# Patient Record
Sex: MALE | Race: WHITE | Employment: OTHER | URBAN - NONMETROPOLITAN AREA
[De-identification: names, ages, dates, MRNs, and addresses within clinical notes are randomized per-mention and may not be internally consistent; named-entity substitution may affect disease eponyms.]

---

## 2017-06-06 ENCOUNTER — DOCTOR'S OFFICE (OUTPATIENT)
Dept: URBAN - NONMETROPOLITAN AREA CLINIC 1 | Facility: CLINIC | Age: 82
Setting detail: OPHTHALMOLOGY
End: 2017-06-06
Payer: COMMERCIAL

## 2017-06-06 DIAGNOSIS — Z96.1: ICD-10-CM

## 2017-06-06 DIAGNOSIS — H35.3131: ICD-10-CM

## 2017-06-06 DIAGNOSIS — H04.123: ICD-10-CM

## 2017-06-06 DIAGNOSIS — H26.491: ICD-10-CM

## 2017-06-06 DIAGNOSIS — H40.003: ICD-10-CM

## 2017-06-06 PROCEDURE — 76514 ECHO EXAM OF EYE THICKNESS: CPT | Performed by: OPHTHALMOLOGY

## 2017-06-06 PROCEDURE — 92083 EXTENDED VISUAL FIELD XM: CPT | Performed by: OPHTHALMOLOGY

## 2017-06-06 PROCEDURE — 92134 CPTRZ OPH DX IMG PST SGM RTA: CPT | Performed by: OPHTHALMOLOGY

## 2017-06-06 PROCEDURE — 92014 COMPRE OPH EXAM EST PT 1/>: CPT | Performed by: OPHTHALMOLOGY

## 2017-06-06 ASSESSMENT — VISUAL ACUITY
OD_BCVA: 20/25-
OS_BCVA: 20/30-2

## 2017-06-06 ASSESSMENT — REFRACTION_MANIFEST
OD_VA1: 20/
OS_VA2: 20/
OD_VA3: 20/
OD_VA3: 20/
OS_VA3: 20/
OS_VA3: 20/
OU_VA: 20/
OS_VA1: 20/
OD_VA1: 20/
OS_VA1: 20/
OD_VA1: 20/
OS_VA2: 20/
OD_VA2: 20/
OD_VA2: 20/
OU_VA: 20/
OD_VA3: 20/
OS_VA1: 20/
OU_VA: 20/
OS_VA2: 20/
OD_VA2: 20/
OS_VA3: 20/

## 2017-06-06 ASSESSMENT — PACHYMETRY
OD_CT_CORRECTION: 1
OS_CT_UM: 536
OD_CT_UM: 526
OS_CT_CORRECTION: 1

## 2017-06-06 ASSESSMENT — REFRACTION_CURRENTRX
OS_OVR_VA: 20/
OD_SPHERE: 0.00
OS_OVR_VA: 20/
OD_ADD: +3.00
OD_OVR_VA: 20/
OS_AXIS: 75
OS_OVR_VA: 20/
OD_CYLINDER: -0.50
OS_ADD: +3.00
OD_VPRISM_DIRECTION: BF
OD_AXIS: 92
OS_SPHERE: +0.25
OD_OVR_VA: 20/
OS_VPRISM_DIRECTION: BF
OD_OVR_VA: 20/
OS_CYLINDER: -0.50

## 2017-06-06 ASSESSMENT — TEAR BREAK UP TIME (TBUT)
OD_TBUT: T
OS_TBUT: T

## 2017-06-06 ASSESSMENT — SPHEQUIV_DERIVED
OS_SPHEQUIV: -0.375
OD_SPHEQUIV: -0.25

## 2017-06-06 ASSESSMENT — REFRACTION_AUTOREFRACTION
OS_CYLINDER: -1.75
OS_AXIS: 078
OD_CYLINDER: -1.00
OS_SPHERE: +0.50
OD_SPHERE: +0.25
OD_AXIS: 134

## 2017-06-06 ASSESSMENT — DRY EYES - PHYSICIAN NOTES
OS_GENERALCOMMENTS: KSICCA
OD_GENERALCOMMENTS: KSICCA

## 2017-06-06 ASSESSMENT — CONFRONTATIONAL VISUAL FIELD TEST (CVF)
OD_FINDINGS: FULL
OS_FINDINGS: FULL

## 2017-08-08 ENCOUNTER — ALLSCRIPTS OFFICE VISIT (OUTPATIENT)
Dept: OTHER | Facility: OTHER | Age: 82
End: 2017-08-08

## 2017-08-08 LAB
BILIRUB UR QL STRIP: NEGATIVE
CLARITY UR: NORMAL
COLOR UR: NORMAL
GLUCOSE (HISTORICAL): NEGATIVE
HGB UR QL STRIP.AUTO: NORMAL
KETONES UR STRIP-MCNC: NEGATIVE MG/DL
LEUKOCYTE ESTERASE UR QL STRIP: NORMAL
NITRITE UR QL STRIP: NORMAL
PH UR STRIP.AUTO: 5.5 [PH]
PROT UR STRIP-MCNC: 300 MG/DL
SP GR UR STRIP.AUTO: > 1.03
UROBILINOGEN UR QL STRIP.AUTO: 0.2

## 2017-10-24 ENCOUNTER — GENERIC CONVERSION - ENCOUNTER (OUTPATIENT)
Dept: OTHER | Facility: OTHER | Age: 82
End: 2017-10-24

## 2017-12-06 ENCOUNTER — DOCTOR'S OFFICE (OUTPATIENT)
Dept: URBAN - NONMETROPOLITAN AREA CLINIC 1 | Facility: CLINIC | Age: 82
Setting detail: OPHTHALMOLOGY
End: 2017-12-06
Payer: COMMERCIAL

## 2017-12-06 DIAGNOSIS — H04.123: ICD-10-CM

## 2017-12-06 DIAGNOSIS — Z96.1: ICD-10-CM

## 2017-12-06 DIAGNOSIS — H04.122: ICD-10-CM

## 2017-12-06 DIAGNOSIS — H35.3131: ICD-10-CM

## 2017-12-06 DIAGNOSIS — H04.121: ICD-10-CM

## 2017-12-06 DIAGNOSIS — H26.491: ICD-10-CM

## 2017-12-06 DIAGNOSIS — H40.003: ICD-10-CM

## 2017-12-06 PROCEDURE — 92133 CPTRZD OPH DX IMG PST SGM ON: CPT | Performed by: OPHTHALMOLOGY

## 2017-12-06 PROCEDURE — 83861 MICROFLUID ANALY TEARS: CPT | Performed by: OPHTHALMOLOGY

## 2017-12-06 PROCEDURE — 92014 COMPRE OPH EXAM EST PT 1/>: CPT | Performed by: OPHTHALMOLOGY

## 2017-12-06 ASSESSMENT — REFRACTION_MANIFEST
OD_VA1: 20/
OD_VA2: 20/
OS_VA2: 20/
OS_VA3: 20/
OD_VA3: 20/
OD_VA2: 20/
OU_VA: 20/
OU_VA: 20/
OS_VA2: 20/
OS_VA3: 20/
OD_VA2: 20/
OD_VA3: 20/
OS_VA1: 20/
OD_VA1: 20/
OS_VA1: 20/
OD_VA1: 20/
OS_VA1: 20/
OD_VA3: 20/
OS_VA3: 20/
OU_VA: 20/
OS_VA2: 20/

## 2017-12-06 ASSESSMENT — REFRACTION_AUTOREFRACTION
OD_CYLINDER: -1.00
OS_AXIS: 078
OS_SPHERE: +0.50
OD_SPHERE: +0.25
OD_AXIS: 134
OS_CYLINDER: -1.75

## 2017-12-06 ASSESSMENT — CONFRONTATIONAL VISUAL FIELD TEST (CVF)
OS_FINDINGS: FULL
OD_FINDINGS: FULL

## 2017-12-06 ASSESSMENT — REFRACTION_CURRENTRX
OS_ADD: +3.00
OD_OVR_VA: 20/
OD_OVR_VA: 20/
OS_SPHERE: +0.25
OD_CYLINDER: -0.50
OS_VPRISM_DIRECTION: BF
OD_OVR_VA: 20/
OS_OVR_VA: 20/
OD_SPHERE: PLANO
OS_OVR_VA: 20/
OS_CYLINDER: -0.50
OD_VPRISM_DIRECTION: BF
OD_ADD: +3.00
OS_AXIS: 75
OS_OVR_VA: 20/
OD_AXIS: 92

## 2017-12-06 ASSESSMENT — TEAR BREAK UP TIME (TBUT)
OD_TBUT: T
OS_TBUT: T

## 2017-12-06 ASSESSMENT — VISUAL ACUITY
OD_BCVA: 20/25-
OS_BCVA: 20/25-2

## 2017-12-06 ASSESSMENT — SPHEQUIV_DERIVED
OD_SPHEQUIV: -0.25
OS_SPHEQUIV: -0.375

## 2017-12-06 ASSESSMENT — DRY EYES - PHYSICIAN NOTES
OD_GENERALCOMMENTS: KSICCA
OS_GENERALCOMMENTS: KSICCA

## 2018-01-13 VITALS
WEIGHT: 158 LBS | DIASTOLIC BLOOD PRESSURE: 84 MMHG | SYSTOLIC BLOOD PRESSURE: 142 MMHG | BODY MASS INDEX: 20.28 KG/M2 | HEIGHT: 74 IN

## 2018-01-23 NOTE — MISCELLANEOUS
Message   Recorded as Task   Date: 01/04/2018 10:35 AM, Created By: Paula Tapia   Task Name: Care Coordination   Assigned To: Brandon HUITRON,TEAM   Regarding Patient: Marty Gong, Status: In Progress   Comment:    Dayana Hicks - 04 Jan 2018 10:35 AM     TASK CREATED  Patient called in asking to speak to the nurse can he be called back at  please and thank you  Art Cornell - 04 Jan 2018 10:43 AM     TASK IN PROGRESS   Temchris Cornell - 04 Jan 2018 10:44 AM     TASK EDITED  Pt wants to transfer records to [facility] pt a medical request form  Active Problems    1  Acute UTI (599 0) (N39 0)    Current Meds   1  AmLODIPine Besylate 10 MG Oral Tablet; Therapy: (Recorded:07Aug2017) to Recorded   2  Aspirin 81 MG TABS; Therapy: (Recorded:07Aug2017) to Recorded   3  Atorvastatin Calcium 10 MG Oral Tablet; Therapy: (Roro Saldana) to Recorded   4  Cephalexin 250 MG Oral Capsule; TAKE 1 CAPSULE 4 TIMES DAILY; Therapy: 08Aug2017 to (Evaluate:[id number])  Requested for: 08Aug2017; Last   Rx:08Aug2017; Status: ACTIVE - Transmit to Pharmacy - Awaiting Verification   Ordered   5  Fish Oil 1000 MG Oral Capsule; Therapy: (Roro Saldana) to Recorded   6  Nitroglycerin 0 4 MG Sublingual Tablet Sublingual;   Therapy: (Recorded:07Aug2017) to Recorded   7  Ocuvite PreserVision TABS; Therapy: (Roro Saldana) to Recorded   8  Pantoprazole Sodium 40 MG Oral Tablet Delayed Release; Therapy: (Recorded:07Aug2017) to Recorded   9  Toprol XL 50 MG Oral Tablet Extended Release 24 Hour (Metoprolol Succinate ER); Therapy: (Recorded:07Aug2017) to Recorded   10  Vitamin D3 400 UNIT Oral Capsule; Therapy: (Recorded:07Aug2017) to Recorded    Allergies    1  No Known Drug Allergies    2  No Known Environmental Allergies   3   No Known Food Allergies    Signatures   Electronically signed by : Gonzalo Lovell, ; Jan 4 2018 10:45AM EST (Author)

## 2018-06-26 ENCOUNTER — DOCTOR'S OFFICE (OUTPATIENT)
Dept: URBAN - NONMETROPOLITAN AREA CLINIC 1 | Facility: CLINIC | Age: 83
Setting detail: OPHTHALMOLOGY
End: 2018-06-26
Payer: COMMERCIAL

## 2018-06-26 DIAGNOSIS — H04.121: ICD-10-CM

## 2018-06-26 DIAGNOSIS — B02.29: ICD-10-CM

## 2018-06-26 DIAGNOSIS — H04.123: ICD-10-CM

## 2018-06-26 DIAGNOSIS — H04.222: ICD-10-CM

## 2018-06-26 DIAGNOSIS — H04.122: ICD-10-CM

## 2018-06-26 DIAGNOSIS — H40.013: ICD-10-CM

## 2018-06-26 DIAGNOSIS — H35.3131: ICD-10-CM

## 2018-06-26 DIAGNOSIS — Z96.1: ICD-10-CM

## 2018-06-26 DIAGNOSIS — H26.491: ICD-10-CM

## 2018-06-26 PROCEDURE — 92083 EXTENDED VISUAL FIELD XM: CPT | Performed by: OPHTHALMOLOGY

## 2018-06-26 PROCEDURE — 83861 MICROFLUID ANALY TEARS: CPT | Performed by: OPHTHALMOLOGY

## 2018-06-26 PROCEDURE — 92134 CPTRZ OPH DX IMG PST SGM RTA: CPT | Performed by: OPHTHALMOLOGY

## 2018-06-26 PROCEDURE — 92014 COMPRE OPH EXAM EST PT 1/>: CPT | Performed by: OPHTHALMOLOGY

## 2018-06-26 ASSESSMENT — REFRACTION_CURRENTRX
OD_VPRISM_DIRECTION: BF
OS_OVR_VA: 20/
OD_OVR_VA: 20/
OS_ADD: +3.00
OS_CYLINDER: -0.50
OS_SPHERE: +0.25
OD_SPHERE: PLANO
OD_OVR_VA: 20/
OS_OVR_VA: 20/
OS_AXIS: 75
OD_AXIS: 92
OD_CYLINDER: -0.50
OS_OVR_VA: 20/
OD_ADD: +3.00
OS_VPRISM_DIRECTION: BF
OD_OVR_VA: 20/

## 2018-06-26 ASSESSMENT — REFRACTION_MANIFEST
OS_VA2: 20/
OD_VA2: 20/
OS_VA2: 20/
OD_VA1: 20/
OD_VA3: 20/
OD_VA3: 20/
OS_VA2: 20/
OU_VA: 20/
OU_VA: 20/
OS_VA1: 20/
OD_VA2: 20/
OD_VA1: 20/
OS_VA1: 20/
OU_VA: 20/
OD_VA2: 20/
OS_VA1: 20/
OD_VA1: 20/
OD_VA3: 20/
OS_VA3: 20/

## 2018-06-26 ASSESSMENT — SPHEQUIV_DERIVED
OD_SPHEQUIV: -0.25
OS_SPHEQUIV: -0.375

## 2018-06-26 ASSESSMENT — VISUAL ACUITY
OS_BCVA: 20/25
OD_BCVA: 20/25

## 2018-06-26 ASSESSMENT — REFRACTION_AUTOREFRACTION
OS_AXIS: 078
OS_CYLINDER: -1.75
OD_CYLINDER: -1.00
OS_SPHERE: +0.50
OD_SPHERE: +0.25
OD_AXIS: 134

## 2018-06-26 ASSESSMENT — DRY EYES - PHYSICIAN NOTES
OS_GENERALCOMMENTS: KSICCA
OD_GENERALCOMMENTS: KSICCA

## 2018-06-26 ASSESSMENT — TEAR BREAK UP TIME (TBUT)
OS_TBUT: T
OD_TBUT: T

## 2018-06-26 ASSESSMENT — CONFRONTATIONAL VISUAL FIELD TEST (CVF)
OS_FINDINGS: FULL
OD_FINDINGS: FULL

## 2018-07-27 ENCOUNTER — DOCTOR'S OFFICE (OUTPATIENT)
Dept: URBAN - NONMETROPOLITAN AREA CLINIC 1 | Facility: CLINIC | Age: 83
Setting detail: OPHTHALMOLOGY
End: 2018-07-27
Payer: COMMERCIAL

## 2018-07-27 DIAGNOSIS — H02.105: ICD-10-CM

## 2018-07-27 DIAGNOSIS — H26.491: ICD-10-CM

## 2018-07-27 DIAGNOSIS — Z96.1: ICD-10-CM

## 2018-07-27 DIAGNOSIS — H04.121: ICD-10-CM

## 2018-07-27 DIAGNOSIS — H35.3131: ICD-10-CM

## 2018-07-27 DIAGNOSIS — H04.222: ICD-10-CM

## 2018-07-27 DIAGNOSIS — H40.003: ICD-10-CM

## 2018-07-27 DIAGNOSIS — H04.122: ICD-10-CM

## 2018-07-27 DIAGNOSIS — B02.29: ICD-10-CM

## 2018-07-27 PROCEDURE — 92012 INTRM OPH EXAM EST PATIENT: CPT | Performed by: OPHTHALMOLOGY

## 2018-07-27 ASSESSMENT — REFRACTION_MANIFEST
OS_VA2: 20/
OS_VA1: 20/
OD_VA3: 20/
OD_VA1: 20/
OS_VA1: 20/
OS_VA3: 20/
OD_VA2: 20/
OD_VA3: 20/
OD_VA2: 20/
OS_VA3: 20/
OS_VA2: 20/
OS_VA2: 20/
OS_VA3: 20/
OD_VA1: 20/
OD_VA3: 20/
OU_VA: 20/
OU_VA: 20/
OD_VA2: 20/
OU_VA: 20/
OS_VA1: 20/
OD_VA1: 20/

## 2018-07-27 ASSESSMENT — LID POSITION - COMMENTS: OS_COMMENTS: PUNCTAL ECTROPION LLL

## 2018-07-27 ASSESSMENT — REFRACTION_CURRENTRX
OD_AXIS: 92
OS_SPHERE: +0.25
OS_CYLINDER: -0.50
OD_OVR_VA: 20/
OS_OVR_VA: 20/
OD_ADD: +3.00
OS_ADD: +3.00
OD_SPHERE: PLANO
OD_OVR_VA: 20/
OD_CYLINDER: -0.50
OS_OVR_VA: 20/
OS_OVR_VA: 20/
OS_VPRISM_DIRECTION: BF
OD_OVR_VA: 20/
OS_AXIS: 75
OD_VPRISM_DIRECTION: BF

## 2018-07-27 ASSESSMENT — SPHEQUIV_DERIVED
OD_SPHEQUIV: -0.25
OS_SPHEQUIV: -0.375

## 2018-07-27 ASSESSMENT — REFRACTION_AUTOREFRACTION
OS_CYLINDER: -1.75
OD_SPHERE: +0.25
OD_AXIS: 134
OS_AXIS: 078
OD_CYLINDER: -1.00
OS_SPHERE: +0.50

## 2018-07-27 ASSESSMENT — VISUAL ACUITY
OD_BCVA: 20/40+2
OS_BCVA: 20/25-1

## 2018-07-27 ASSESSMENT — TEAR BREAK UP TIME (TBUT)
OS_TBUT: T
OD_TBUT: T

## 2018-07-27 ASSESSMENT — DRY EYES - PHYSICIAN NOTES
OD_GENERALCOMMENTS: KSICCA
OS_GENERALCOMMENTS: KSICCA

## 2018-07-27 ASSESSMENT — LID POSITION - ECTROPION: OS_ECTROPION: LLL 1+

## 2018-08-10 ENCOUNTER — OFFICE VISIT (OUTPATIENT)
Dept: UROLOGY | Facility: MEDICAL CENTER | Age: 83
End: 2018-08-10
Payer: MEDICARE

## 2018-08-10 VITALS
DIASTOLIC BLOOD PRESSURE: 76 MMHG | WEIGHT: 152 LBS | SYSTOLIC BLOOD PRESSURE: 122 MMHG | BODY MASS INDEX: 19.51 KG/M2 | HEIGHT: 74 IN

## 2018-08-10 DIAGNOSIS — N40.0 BPH WITHOUT OBSTRUCTION/LOWER URINARY TRACT SYMPTOMS: Primary | ICD-10-CM

## 2018-08-10 LAB
SL AMB  POCT GLUCOSE, UA: NEGATIVE
SL AMB LEUKOCYTE ESTERASE,UA: ABNORMAL
SL AMB POCT BILIRUBIN,UA: NEGATIVE
SL AMB POCT BLOOD,UA: ABNORMAL
SL AMB POCT CLARITY,UA: CLEAR
SL AMB POCT COLOR,UA: YELLOW
SL AMB POCT KETONES,UA: NEGATIVE
SL AMB POCT NITRITE,UA: NEGATIVE
SL AMB POCT PH,UA: 6
SL AMB POCT SPECIFIC GRAVITY,UA: 1.02
SL AMB POCT URINE PROTEIN: ABNORMAL
SL AMB POCT UROBILINOGEN: 0.2

## 2018-08-10 PROCEDURE — 99214 OFFICE O/P EST MOD 30 MIN: CPT | Performed by: UROLOGY

## 2018-08-10 PROCEDURE — 81003 URINALYSIS AUTO W/O SCOPE: CPT | Performed by: UROLOGY

## 2018-08-10 RX ORDER — ACETAMINOPHEN 500 MG
500 TABLET ORAL AS NEEDED
COMMUNITY
Start: 2016-12-23

## 2018-08-10 RX ORDER — BIOTIN 1 MG
TABLET ORAL DAILY
COMMUNITY

## 2018-08-10 RX ORDER — PANTOPRAZOLE SODIUM 40 MG/1
TABLET, DELAYED RELEASE ORAL DAILY
COMMUNITY

## 2018-08-10 RX ORDER — METOPROLOL SUCCINATE 50 MG/1
1.5 TABLET, EXTENDED RELEASE ORAL DAILY
COMMUNITY

## 2018-08-10 RX ORDER — CHLORAL HYDRATE 500 MG
CAPSULE ORAL DAILY
COMMUNITY

## 2018-08-10 RX ORDER — NITROGLYCERIN 0.4 MG/1
TABLET SUBLINGUAL AS NEEDED
COMMUNITY

## 2018-08-10 RX ORDER — ATORVASTATIN CALCIUM 10 MG/1
TABLET, FILM COATED ORAL DAILY
COMMUNITY

## 2018-08-10 RX ORDER — AMLODIPINE BESYLATE 10 MG/1
TABLET ORAL AS NEEDED
COMMUNITY

## 2018-08-10 NOTE — ASSESSMENT & PLAN NOTE
The patient is asymptomatic approximately 1 year following a GreenLight laser transurethral resection of his prostate  Digital rectal exam is mildly abnormal considering the patient's age and lack of symptoms, the firmness on the right side of his prostate does not require workup now

## 2018-08-10 NOTE — PATIENT INSTRUCTIONS
Benign Prostatic Hypertrophy   WHAT YOU NEED TO KNOW:   What is benign prostatic hypertrophy? Benign prostatic hypertrophy (BPH) is a condition that causes your prostate gland to grow larger than normal  The prostate gland is the male sex gland that produces a fluid that is part of semen  It is about the size of a walnut and it is located under the bladder  As the prostate grows, it can squeeze the urethra  This can block urine flow and cause urinary problems  What causes BPH? It is not known what causes BPH  It may be just part of getting older  BPH is very common in men over 39years of age, but rarely causes problems before age 61  Some healthcare providers believe it is caused by a change in hormone levels as men get older  What are the signs and symptoms of BPH? · Feeling like you have not emptied your bladder    · Feeling the need to urinate right away     · Urine does not flow right away when you start to urinate or stops and starts again    · Frequent urination, especially at night    · Weak urine stream    · Blood in your urine  How is BPH diagnosed? · Blood tests:  Blood tests such as prostate specific antigen (PSA) measurement may be done  The amount of PSA in your blood may go up if you have BPH  · Cystoscopy: A cystoscopy allows caregivers to look for problems inside your bladder  A cystoscope is put into your bladder through your urethra  The urethra is the tube that urine flows through when you urinate  The cystoscope is a long tube with a lens and a light on the end  The scope may be hooked to a camera or monitor, and pictures may be taken  A tissue sample may also be taken during your cystoscopy  During this test, small tumors may be removed or bleeding may be stopped       · Digital rectal examination:  Your healthcare provider will use his finger to feel if your prostate is larger than normal      · Prostate biopsy:  A small piece of the prostate is removed and sent to a lab for tests      · Transrectal ultrasound:  Sound waves are used to show pictures of your prostate on a monitor  · Urine tests:     ¨ Urine sample: For this test you need to urinate into a small container  You will be given instructions on how to clean your genital area before you urinate  Do not touch the inside of the cup  Follow instructions on where to place the cup of urine when you are done  ¨ Residual urine measurement:  Healthcare providers may use a catheter to measure how much urine is left in your bladder after you urinate  ¨ Flow rate recording: This is a test that measures the speed of your urine stream   How is BPH treated? · Medicines:      ¨ Alpha blockers: This medicine relaxes the muscles in your prostate and bladder  It may help you urinate more easily  ¨ 5 alpha reductase inhibitors: These medicines block the production of a hormone that causes the prostate to get larger  It may help to slow the growth of the prostate or shrink the prostate  · Stent:  A stent is a short, tiny mesh tube that is put into the urethra to hold it open  · Surgery: You may need surgery to remove your prostate  · Other procedures: The prostate may be made smaller by a procedure such as a transurethral needle ablation (TUNA) or microwave heat treatment  You may also have a laser procedure called interstitial laser coagulation (ILC) to remove the prostate  What are the risks of BPH?   · Surgery to remove your prostate may cause you to bleed more than expected or get an infection  You may also have trouble controlling your bladder  This may cause you to leak urine  You may also have sudden strong urges to urinate that make it hard for you to hold your urine  · Without treatment, you may have other health problems  If urine stays in your bladder too long, you may develop a urinary infection or bladder stones   Small blood vessels in the bladder and prostate may start to bleed because you strain more when you urinate  The urethra may suddenly become blocked off, making it even harder to urinate  Over time, your kidneys can be damaged as urine backs up from the bladder and into the kidneys  How can I manage BPH?   · Do not let your bladder get too full before you empty it  Urinate when you feel the urge  · Limit alcohol  Do not drink large amounts of any liquid at one time  · Decrease the amount of salt you eat  Examples of salty foods are chips, cured meats, and canned soups  Do not use table salt  · Healthcare providers may tell you not to eat spicy foods such as chilli peppers  This may help you find out if spicy food makes your BPH symptoms worse  · You may have sex if you feel well  When should I contact my healthcare provider? · There is a large amount of blood in your urine  · Your signs and symptoms get worse  · You have a fever  · You have questions or concerns about your condition or care  When should I seek immediate care? · You are unable to urinate  · Your bladder feels very full and painful  CARE AGREEMENT:   You have the right to help plan your care  Learn about your health condition and how it may be treated  Discuss treatment options with your caregivers to decide what care you want to receive  You always have the right to refuse treatment  The above information is an  only  It is not intended as medical advice for individual conditions or treatments  Talk to your doctor, nurse or pharmacist before following any medical regimen to see if it is safe and effective for you  © 2017 [employer] Information is for End User's use only and may not be sold, redistributed or otherwise used for commercial purposes  All illustrations and images included in CareNotes® are the copyrighted property of A [employer]  or Volodymyr Rodriguez

## 2018-08-10 NOTE — PROGRESS NOTES
IPSS Questionnaire (AUA-7): Over the past month    1)  How often have you had a sensation of not emptying your bladder completely after you finish urinating? 0 - Not at all   2)  How often have you had to urinate again less than two hours after you finished urinating? 1 - Less than 1 time in 5   3)  How often have you found you stopped and started again several times when you urinated? 0 - Not at all   4) How difficult have you found it to postpone urination? 1 - Less than 1 time in 5   5) How often have you had a weak urinary stream?  1 - Less than 1 time in 5   6) How often have you had to push or strain to begin urination? 0 - Not at all   7) How many times did you most typically get up to urinate from the time you went to bed until the time you got up in the morning?   1 - 1 time   Total Score:  4

## 2018-08-10 NOTE — PROGRESS NOTES
Assessment/Plan:    BPH without obstruction/lower urinary tract symptoms   The patient is asymptomatic approximately 1 year following a GreenLight laser transurethral resection of his prostate  Digital rectal exam is mildly abnormal considering the patient's age and lack of symptoms, the firmness on the right side of his prostate does not require workup now  Diagnoses and all orders for this visit:    BPH without obstruction/lower urinary tract symptoms  -     POCT urine dip auto non-scope    Other orders  -     acetaminophen (TYLENOL) 500 mg tablet; Take 500 mg by mouth as needed  -     amLODIPine (NORVASC) 10 mg tablet; Take by mouth as needed  -     aspirin 81 MG tablet; Take by mouth daily  -     atorvastatin (LIPITOR) 10 mg tablet; Take by mouth daily  -     Omega-3 Fatty Acids (FISH OIL) 1,000 mg; Take by mouth daily  -     nitroglycerin (NITROSTAT) 0 4 mg SL tablet; Place under the tongue as needed  -     Multiple Vitamins-Minerals (OCUVITE PRESERVISION PO); Take by mouth daily  -     pantoprazole (PROTONIX) 40 mg tablet; Take by mouth daily  -     Cholecalciferol (VITAMIN D3) 1000 units CAPS; Take by mouth daily  -     metoprolol succinate (TOPROL XL) 50 mg 24 hr tablet; Take 1 5 tablets by mouth daily          Subjective:      Patient ID: Samira Cannon is a 80 y o  male  HPI   BPH without obstruction:   The patient had a GreenLight laser TUR on June 1, 2017  He notes nocturia x 1  He denies other significant urinary symptoms  He denies gross hematuria, urinary tract infections or incontinence  He is taking neither medications nor supplements for his symptoms  The following portions of the patient's history were reviewed and updated as appropriate: allergies, current medications, past family history, past medical history, past social history, past surgical history and problem list     Review of Systems   Constitutional: Negative for activity change and fatigue     Respiratory: Negative for shortness of breath and wheezing  Cardiovascular: Negative for chest pain  HTN  Gastrointestinal: Negative for abdominal pain  Genitourinary: Negative for difficulty urinating, dysuria, frequency, hematuria and urgency  Musculoskeletal: Negative for back pain and gait problem  Skin: Negative  Allergic/Immunologic: Negative  Neurological: Negative  Psychiatric/Behavioral: Negative  Objective:      /76 (BP Location: Left arm, Patient Position: Sitting, Cuff Size: Standard)   Ht 6' 2" (1 88 m)   Wt 68 9 kg (152 lb)   BMI 19 52 kg/m²          Physical Exam   Constitutional: He is oriented to person, place, and time  He appears well-developed and well-nourished  HENT:   Head: Normocephalic and atraumatic  Eyes: EOM are normal    Neck: Normal range of motion  Neck supple  Pulmonary/Chest: Effort normal    Genitourinary: Rectum normal    Genitourinary Comments: The prostate is 40 grams, firm, smooth and non-tender  Right lobe larger and firmer than left  Musculoskeletal: Normal range of motion  Neurological: He is alert and oriented to person, place, and time  Skin: Skin is warm and dry  Psychiatric: He has a normal mood and affect   His behavior is normal  Judgment and thought content normal

## 2018-08-10 NOTE — LETTER
August 10, 2018     Jerrica Martínez MD  [address] [place] [id number]    Patient: Miroslava Garcia   YOB: 1930   Date of Visit: 8/10/2018       Dear Dr Janette Rico:    Thank you for referring Naya Vail to me for evaluation  Below are my notes for this consultation  If you have questions, please do not hesitate to call me  I look forward to following your patient along with you  Sincerely,        Alison Pennington MD        CC: No Recipients  Alison Pennington MD  8/10/2018 10:03 AM  Sign at close encounter  Assessment/Plan:    BPH without obstruction/lower urinary tract symptoms   The patient is asymptomatic approximately 1 year following a GreenLight laser transurethral resection of his prostate  Digital rectal exam is mildly abnormal considering the patient's age and lack of symptoms, the firmness on the right side of his prostate does not require workup now  Diagnoses and all orders for this visit:    BPH without obstruction/lower urinary tract symptoms  -     POCT urine dip auto non-scope    Other orders  -     acetaminophen (TYLENOL) 500 mg tablet; Take 500 mg by mouth as needed  -     amLODIPine (NORVASC) 10 mg tablet; Take by mouth as needed  -     aspirin 81 MG tablet; Take by mouth daily  -     atorvastatin (LIPITOR) 10 mg tablet; Take by mouth daily  -     Omega-3 Fatty Acids (FISH OIL) 1,000 mg; Take by mouth daily  -     nitroglycerin (NITROSTAT) 0 4 mg SL tablet; Place under the tongue as needed  -     Multiple Vitamins-Minerals (OCUVITE PRESERVISION PO); Take by mouth daily  -     pantoprazole (PROTONIX) 40 mg tablet; Take by mouth daily  -     Cholecalciferol (VITAMIN D3) 1000 units CAPS; Take by mouth daily  -     metoprolol succinate (TOPROL XL) 50 mg 24 hr tablet; Take 1 5 tablets by mouth daily          Subjective:      Patient ID: Miroslava Garcia is a 80 y o  male      HPI   BPH without obstruction:   The patient had a GreenLight laser TUR on June 1, 2017  He notes nocturia x 1  He denies other significant urinary symptoms  He denies gross hematuria, urinary tract infections or incontinence  He is taking neither medications nor supplements for his symptoms  The following portions of the patient's history were reviewed and updated as appropriate: allergies, current medications, past family history, past medical history, past social history, past surgical history and problem list     Review of Systems   Constitutional: Negative for activity change and fatigue  Respiratory: Negative for shortness of breath and wheezing  Cardiovascular: Negative for chest pain  HTN  Gastrointestinal: Negative for abdominal pain  Genitourinary: Negative for difficulty urinating, dysuria, frequency, hematuria and urgency  Musculoskeletal: Negative for back pain and gait problem  Skin: Negative  Allergic/Immunologic: Negative  Neurological: Negative  Psychiatric/Behavioral: Negative  Objective:      /76 (BP Location: Left arm, Patient Position: Sitting, Cuff Size: Standard)   Ht 6' 2" (1 88 m)   Wt 68 9 kg (152 lb)   BMI 19 52 kg/m²           Physical Exam   Constitutional: He is oriented to person, place, and time  He appears well-developed and well-nourished  HENT:   Head: Normocephalic and atraumatic  Eyes: EOM are normal    Neck: Normal range of motion  Neck supple  Pulmonary/Chest: Effort normal    Genitourinary: Rectum normal    Genitourinary Comments: The prostate is 40 grams, firm, smooth and non-tender  Right lobe larger and firmer than left  Musculoskeletal: Normal range of motion  Neurological: He is alert and oriented to person, place, and time  Skin: Skin is warm and dry  Psychiatric: He has a normal mood and affect   His behavior is normal  Judgment and thought content normal

## 2019-05-01 ENCOUNTER — DOCTOR'S OFFICE (OUTPATIENT)
Dept: URBAN - NONMETROPOLITAN AREA CLINIC 1 | Facility: CLINIC | Age: 84
Setting detail: OPHTHALMOLOGY
End: 2019-05-01
Payer: COMMERCIAL

## 2019-05-01 DIAGNOSIS — B02.29: ICD-10-CM

## 2019-05-01 DIAGNOSIS — H02.105: ICD-10-CM

## 2019-05-01 DIAGNOSIS — H04.122: ICD-10-CM

## 2019-05-01 DIAGNOSIS — Z96.1: ICD-10-CM

## 2019-05-01 DIAGNOSIS — H40.1121: ICD-10-CM

## 2019-05-01 DIAGNOSIS — H04.121: ICD-10-CM

## 2019-05-01 PROCEDURE — 83861 MICROFLUID ANALY TEARS: CPT | Performed by: OPHTHALMOLOGY

## 2019-05-01 PROCEDURE — 92133 CPTRZD OPH DX IMG PST SGM ON: CPT | Performed by: OPHTHALMOLOGY

## 2019-05-01 PROCEDURE — 92014 COMPRE OPH EXAM EST PT 1/>: CPT | Performed by: OPHTHALMOLOGY

## 2019-05-01 PROCEDURE — 76514 ECHO EXAM OF EYE THICKNESS: CPT | Performed by: OPHTHALMOLOGY

## 2019-05-01 ASSESSMENT — REFRACTION_CURRENTRX
OD_OVR_VA: 20/
OS_SPHERE: +0.25
OD_SPHERE: PLANO
OD_ADD: +3.00
OS_VPRISM_DIRECTION: BF
OS_CYLINDER: -0.50
OS_AXIS: 75
OD_AXIS: 92
OS_OVR_VA: 20/
OD_CYLINDER: -0.50
OS_OVR_VA: 20/
OD_VPRISM_DIRECTION: BF
OS_ADD: +3.00
OD_OVR_VA: 20/
OS_OVR_VA: 20/
OD_OVR_VA: 20/

## 2019-05-01 ASSESSMENT — REFRACTION_MANIFEST
OD_VA1: 20/
OU_VA: 20/
OS_VA2: 20/
OS_VA1: 20/
OS_VA3: 20/
OS_VA3: 20/
OD_VA1: 20/
OD_VA3: 20/
OD_VA3: 20/
OD_VA2: 20/
OS_VA1: 20/
OD_VA2: 20/
OS_VA2: 20/
OU_VA: 20/

## 2019-05-01 ASSESSMENT — SPHEQUIV_DERIVED
OS_SPHEQUIV: 1.125
OD_SPHEQUIV: 0.5

## 2019-05-01 ASSESSMENT — REFRACTION_AUTOREFRACTION
OD_CYLINDER: -0.50
OS_CYLINDER: -2.25
OS_SPHERE: +2.25
OS_AXIS: 094
OD_AXIS: 063
OD_SPHERE: +0.75

## 2019-05-01 ASSESSMENT — LID POSITION - COMMENTS: OS_COMMENTS: PUNCTAL ECTROPION LLL

## 2019-05-01 ASSESSMENT — VISUAL ACUITY
OS_BCVA: 20/40
OD_BCVA: 20/30-1

## 2019-05-01 ASSESSMENT — DRY EYES - PHYSICIAN NOTES
OD_GENERALCOMMENTS: KSICCA
OS_GENERALCOMMENTS: KSICCA

## 2019-05-01 ASSESSMENT — CONFRONTATIONAL VISUAL FIELD TEST (CVF)
OD_FINDINGS: FULL
OS_FINDINGS: FULL

## 2019-05-01 ASSESSMENT — LID POSITION - ECTROPION: OS_ECTROPION: LLL 1+

## 2019-05-01 ASSESSMENT — PACHYMETRY
OS_CT_CORRECTION: 1
OS_CT_UM: 523
OD_CT_UM: 524
OD_CT_CORRECTION: 1

## 2019-05-01 ASSESSMENT — TEAR BREAK UP TIME (TBUT)
OS_TBUT: T
OD_TBUT: T

## 2019-08-20 ENCOUNTER — RX ONLY (RX ONLY)
Age: 84
End: 2019-08-20

## 2019-08-20 ENCOUNTER — DOCTOR'S OFFICE (OUTPATIENT)
Dept: URBAN - NONMETROPOLITAN AREA CLINIC 1 | Facility: CLINIC | Age: 84
Setting detail: OPHTHALMOLOGY
End: 2019-08-20
Payer: COMMERCIAL

## 2019-08-20 DIAGNOSIS — Z96.1: ICD-10-CM

## 2019-08-20 DIAGNOSIS — B02.29: ICD-10-CM

## 2019-08-20 DIAGNOSIS — H40.1121: ICD-10-CM

## 2019-08-20 DIAGNOSIS — H35.3131: ICD-10-CM

## 2019-08-20 DIAGNOSIS — H04.123: ICD-10-CM

## 2019-08-20 PROCEDURE — 92014 COMPRE OPH EXAM EST PT 1/>: CPT | Performed by: OPHTHALMOLOGY

## 2019-08-20 PROCEDURE — 92133 CPTRZD OPH DX IMG PST SGM ON: CPT | Performed by: OPHTHALMOLOGY

## 2019-08-20 ASSESSMENT — SPHEQUIV_DERIVED
OD_SPHEQUIV: 0.375
OS_SPHEQUIV: 0.625

## 2019-08-20 ASSESSMENT — REFRACTION_CURRENTRX
OS_OVR_VA: 20/
OD_OVR_VA: 20/
OD_AXIS: 92
OS_OVR_VA: 20/
OD_ADD: +3.00
OS_SPHERE: +0.25
OD_CYLINDER: -0.50
OD_OVR_VA: 20/
OS_CYLINDER: -0.50
OD_VPRISM_DIRECTION: BF
OD_OVR_VA: 20/
OS_VPRISM_DIRECTION: BF
OS_OVR_VA: 20/
OS_ADD: +3.00
OS_AXIS: 75
OD_SPHERE: PLANO

## 2019-08-20 ASSESSMENT — VISUAL ACUITY
OD_BCVA: 20/40-1
OS_BCVA: 20/20-2

## 2019-08-20 ASSESSMENT — REFRACTION_AUTOREFRACTION
OD_CYLINDER: -0.25
OS_SPHERE: +1.25
OS_AXIS: 095
OD_SPHERE: +0.50
OD_AXIS: 016
OS_CYLINDER: -1.25

## 2019-08-20 ASSESSMENT — REFRACTION_MANIFEST
OD_VA3: 20/
OU_VA: 20/
OS_VA3: 20/
OD_VA1: 20/
OS_VA1: 20/
OD_VA3: 20/
OS_VA2: 20/
OD_VA2: 20/
OS_VA1: 20/
OS_VA3: 20/
OS_VA2: 20/
OU_VA: 20/
OD_VA1: 20/
OD_VA2: 20/

## 2019-08-20 ASSESSMENT — DRY EYES - PHYSICIAN NOTES
OS_GENERALCOMMENTS: KSICCA
OD_GENERALCOMMENTS: KSICCA

## 2019-08-20 ASSESSMENT — CONFRONTATIONAL VISUAL FIELD TEST (CVF)
OS_FINDINGS: FULL
OD_FINDINGS: FULL

## 2019-08-20 ASSESSMENT — LID POSITION - COMMENTS: OS_COMMENTS: PUNCTAL ECTROPION LLL

## 2019-08-20 ASSESSMENT — TEAR BREAK UP TIME (TBUT)
OS_TBUT: T
OD_TBUT: T

## 2019-08-20 ASSESSMENT — LID POSITION - ECTROPION: OS_ECTROPION: LLL 1+

## 2020-02-25 ENCOUNTER — DOCTOR'S OFFICE (OUTPATIENT)
Dept: URBAN - NONMETROPOLITAN AREA CLINIC 1 | Facility: CLINIC | Age: 85
Setting detail: OPHTHALMOLOGY
End: 2020-02-25
Payer: COMMERCIAL

## 2020-02-25 DIAGNOSIS — Z96.1: ICD-10-CM

## 2020-02-25 DIAGNOSIS — H35.3131: ICD-10-CM

## 2020-02-25 DIAGNOSIS — H04.123: ICD-10-CM

## 2020-02-25 DIAGNOSIS — H40.1121: ICD-10-CM

## 2020-02-25 PROCEDURE — 92134 CPTRZ OPH DX IMG PST SGM RTA: CPT | Performed by: OPHTHALMOLOGY

## 2020-02-25 PROCEDURE — 92014 COMPRE OPH EXAM EST PT 1/>: CPT | Performed by: OPHTHALMOLOGY

## 2020-02-25 PROCEDURE — 92250 FUNDUS PHOTOGRAPHY W/I&R: CPT | Performed by: OPHTHALMOLOGY

## 2020-02-25 ASSESSMENT — REFRACTION_CURRENTRX
OD_AXIS: 92
OS_ADD: +3.00
OS_VPRISM_DIRECTION: BF
OD_SPHERE: PLANO
OD_VPRISM_DIRECTION: BF
OD_OVR_VA: 20/
OS_AXIS: 75
OD_CYLINDER: -0.50
OS_OVR_VA: 20/
OS_SPHERE: +0.25
OS_CYLINDER: -0.50
OD_ADD: +3.00

## 2020-02-25 ASSESSMENT — VISUAL ACUITY
OD_BCVA: 20/30-1
OS_BCVA: 20/20-2

## 2020-02-25 ASSESSMENT — REFRACTION_AUTOREFRACTION
OS_CYLINDER: -1.50
OD_CYLINDER: -0.75
OS_AXIS: 091
OS_SPHERE: +1.50
OD_AXIS: 073
OD_SPHERE: +0.75

## 2020-02-25 ASSESSMENT — SPHEQUIV_DERIVED
OD_SPHEQUIV: 0.375
OS_SPHEQUIV: 0.75

## 2020-02-25 ASSESSMENT — TEAR BREAK UP TIME (TBUT)
OS_TBUT: T
OD_TBUT: T

## 2020-02-25 ASSESSMENT — DRY EYES - PHYSICIAN NOTES
OS_GENERALCOMMENTS: KSICCA
OD_GENERALCOMMENTS: KSICCA

## 2020-02-25 ASSESSMENT — CONFRONTATIONAL VISUAL FIELD TEST (CVF)
OS_FINDINGS: FULL
OD_FINDINGS: FULL

## 2020-02-25 ASSESSMENT — LID POSITION - ECTROPION: OS_ECTROPION: LLL 1+

## 2020-02-25 ASSESSMENT — LID POSITION - COMMENTS: OS_COMMENTS: PUNCTAL ECTROPION LLL

## 2020-05-08 ENCOUNTER — DOCTOR'S OFFICE (OUTPATIENT)
Dept: URBAN - NONMETROPOLITAN AREA CLINIC 1 | Facility: CLINIC | Age: 85
Setting detail: OPHTHALMOLOGY
End: 2020-05-08
Payer: COMMERCIAL

## 2020-05-08 VITALS — HEIGHT: 60 IN

## 2020-05-08 DIAGNOSIS — H40.1121: ICD-10-CM

## 2020-05-08 DIAGNOSIS — H04.222: ICD-10-CM

## 2020-05-08 DIAGNOSIS — H02.105: ICD-10-CM

## 2020-05-08 PROCEDURE — 92133 CPTRZD OPH DX IMG PST SGM ON: CPT | Performed by: OPHTHALMOLOGY

## 2020-05-08 PROCEDURE — 99213 OFFICE O/P EST LOW 20 MIN: CPT | Performed by: OPHTHALMOLOGY

## 2020-05-08 ASSESSMENT — REFRACTION_CURRENTRX
OD_SPHERE: +1.75
OS_VPRISM_DIRECTION: BF
OS_OVR_VA: 20/
OS_CYLINDER: -0.25
OS_ADD: +2.75
OD_ADD: +2.75
OD_AXIS: 084
OS_AXIS: 081
OD_CYLINDER: -0.50
OS_SPHERE: +1.50
OD_OVR_VA: 20/
OD_VPRISM_DIRECTION: BF

## 2020-05-08 ASSESSMENT — VISUAL ACUITY
OS_BCVA: 20/70-2
OD_BCVA: 20/40

## 2020-05-08 ASSESSMENT — REFRACTION_AUTOREFRACTION
OD_CYLINDER: -0.75
OD_SPHERE: +0.75
OD_AXIS: 097
OS_SPHERE: +1.50
OS_AXIS: 084
OS_CYLINDER: -1.25

## 2020-05-08 ASSESSMENT — LID POSITION - COMMENTS: OS_COMMENTS: PUNCTAL ECTROPION LLL

## 2020-05-08 ASSESSMENT — LID POSITION - ECTROPION: OS_ECTROPION: LLL 1+

## 2020-05-08 ASSESSMENT — TEAR BREAK UP TIME (TBUT)
OS_TBUT: T
OD_TBUT: T

## 2020-05-08 ASSESSMENT — CONFRONTATIONAL VISUAL FIELD TEST (CVF)
OS_FINDINGS: FULL
OD_FINDINGS: FULL

## 2020-05-08 ASSESSMENT — DRY EYES - PHYSICIAN NOTES
OS_GENERALCOMMENTS: KSICCA
OD_GENERALCOMMENTS: KSICCA

## 2020-05-08 ASSESSMENT — SPHEQUIV_DERIVED
OD_SPHEQUIV: 0.375
OS_SPHEQUIV: 0.875

## 2020-05-22 ENCOUNTER — DOCTOR'S OFFICE (OUTPATIENT)
Dept: URBAN - NONMETROPOLITAN AREA CLINIC 1 | Facility: CLINIC | Age: 85
Setting detail: OPHTHALMOLOGY
End: 2020-05-22
Payer: COMMERCIAL

## 2020-05-22 ENCOUNTER — RX ONLY (RX ONLY)
Age: 85
End: 2020-05-22

## 2020-05-22 DIAGNOSIS — H04.222: ICD-10-CM

## 2020-05-22 DIAGNOSIS — H02.834: ICD-10-CM

## 2020-05-22 DIAGNOSIS — H02.105: ICD-10-CM

## 2020-05-22 DIAGNOSIS — H02.831: ICD-10-CM

## 2020-05-22 DIAGNOSIS — H40.1121: ICD-10-CM

## 2020-05-22 PROCEDURE — 92012 INTRM OPH EXAM EST PATIENT: CPT | Performed by: OPHTHALMOLOGY

## 2020-05-22 ASSESSMENT — REFRACTION_AUTOREFRACTION
OD_SPHERE: +2.50
OS_CYLINDER: -1.25
OD_AXIS: 104
OS_AXIS: 084
OS_SPHERE: +1.50
OD_CYLINDER: -1.75

## 2020-05-22 ASSESSMENT — REFRACTION_CURRENTRX
OS_OVR_VA: 20/
OD_ADD: +2.75
OS_SPHERE: +1.25
OS_AXIS: 180
OD_OVR_VA: 20/
OS_VPRISM_DIRECTION: BF
OD_AXIS: 081
OS_CYLINDER: 0.00
OS_ADD: +3.00
OD_SPHERE: +1.75
OD_VPRISM_DIRECTION: BF
OD_CYLINDER: -0.50

## 2020-05-22 ASSESSMENT — VISUAL ACUITY
OD_BCVA: 20/40
OS_BCVA: 20/80+2

## 2020-05-22 ASSESSMENT — LID POSITION - DERMATOCHALASIS
OD_DERMATOCHALASIS: RUL 2+
OS_DERMATOCHALASIS: LUL 2+

## 2020-05-22 ASSESSMENT — SPHEQUIV_DERIVED
OD_SPHEQUIV: 1.625
OS_SPHEQUIV: 0.875

## 2020-05-22 ASSESSMENT — CONFRONTATIONAL VISUAL FIELD TEST (CVF)
OS_FINDINGS: FULL
OD_FINDINGS: FULL

## 2020-05-22 ASSESSMENT — SUPERFICIAL PUNCTATE KERATITIS (SPK)
OD_SPK: 1+
OS_SPK: 1+

## 2020-05-22 ASSESSMENT — LID POSITION - ECTROPION: OS_ECTROPION: LLL 1+

## 2020-05-22 ASSESSMENT — DRY EYES - PHYSICIAN NOTES
OS_GENERALCOMMENTS: KSICCA
OD_GENERALCOMMENTS: KSICCA

## 2020-05-27 ENCOUNTER — DOCTOR'S OFFICE (OUTPATIENT)
Dept: URBAN - NONMETROPOLITAN AREA CLINIC 1 | Facility: CLINIC | Age: 85
Setting detail: OPHTHALMOLOGY
End: 2020-05-27

## 2020-05-27 VITALS — HEIGHT: 60 IN

## 2020-05-27 DIAGNOSIS — Z96.1: ICD-10-CM

## 2020-05-27 DIAGNOSIS — H04.222: ICD-10-CM

## 2020-05-27 DIAGNOSIS — H52.4: ICD-10-CM

## 2020-05-27 PROCEDURE — 92015 DETERMINE REFRACTIVE STATE: CPT | Performed by: OPTOMETRIST

## 2020-05-27 ASSESSMENT — REFRACTION_CURRENTRX
OS_SPHERE: +1.25
OD_ADD: +2.75
OD_OVR_VA: 20/
OD_SPHERE: +1.75
OS_CYLINDER: 0.00
OD_CYLINDER: -0.50
OS_AXIS: 180
OS_VPRISM_DIRECTION: BF
OS_OVR_VA: 20/
OD_VPRISM_DIRECTION: BF
OD_AXIS: 083
OS_ADD: +2.75

## 2020-05-27 ASSESSMENT — REFRACTION_MANIFEST
OD_VA2: 20/30-2
OD_SPHERE: +0.50
OD_CYLINDER: -0.25
OS_SPHERE: +0.50
OS_VA1: 20/30-2
OS_ADD: +2.75
OS_CYLINDER: -0.50
OS_AXIS: 110
OS_VA2: 20/30-2
OD_AXIS: 075
OD_ADD: +2.75
OD_VA1: 20/30-2

## 2020-05-27 ASSESSMENT — VISUAL ACUITY
OS_BCVA: 20/100
OD_BCVA: 20/60-2

## 2020-05-27 ASSESSMENT — SPHEQUIV_DERIVED
OS_SPHEQUIV: 0.25
OD_SPHEQUIV: 0.375
OD_SPHEQUIV: 0.625
OS_SPHEQUIV: 0.5

## 2020-05-27 ASSESSMENT — REFRACTION_AUTOREFRACTION
OS_CYLINDER: -0.50
OD_AXIS: 076
OD_SPHERE: +0.75
OD_CYLINDER: -0.25
OS_SPHERE: +0.75
OS_AXIS: 110

## 2020-05-27 ASSESSMENT — CONFRONTATIONAL VISUAL FIELD TEST (CVF)
OS_FINDINGS: FULL
OD_FINDINGS: FULL

## 2020-06-08 ENCOUNTER — OPTICAL OFFICE (OUTPATIENT)
Dept: URBAN - NONMETROPOLITAN AREA CLINIC 4 | Facility: CLINIC | Age: 85
Setting detail: OPHTHALMOLOGY
End: 2020-06-08

## 2020-06-08 DIAGNOSIS — H52.223: ICD-10-CM

## 2020-06-08 PROCEDURE — V2784 LENS POLYCARB OR EQUAL: HCPCS | Performed by: OPTOMETRIST

## 2020-06-08 PROCEDURE — V2799 MISC VISION ITEM OR SERVICE: HCPCS | Performed by: OPTOMETRIST

## 2020-06-08 PROCEDURE — V2020 VISION SVCS FRAMES PURCHASES: HCPCS | Performed by: OPTOMETRIST

## 2020-06-08 PROCEDURE — V2203 LENS SPHCYL BIFOCAL 4.00D/.1: HCPCS | Performed by: OPTOMETRIST

## 2020-06-16 ENCOUNTER — AMBUL SURGICAL CARE (OUTPATIENT)
Dept: URBAN - NONMETROPOLITAN AREA SURGERY 1 | Facility: SURGERY | Age: 85
Setting detail: OPHTHALMOLOGY
End: 2020-06-16
Payer: COMMERCIAL

## 2020-06-16 DIAGNOSIS — H02.155: ICD-10-CM

## 2020-06-16 DIAGNOSIS — H02.15: ICD-10-CM

## 2020-06-16 DIAGNOSIS — H04.222: ICD-10-CM

## 2020-06-16 PROCEDURE — G8918 PT W/O PREOP ORDER IV AB PRO: HCPCS | Performed by: CLINIC/CENTER

## 2020-06-16 PROCEDURE — 67916 REPAIR EYELID DEFECT: CPT | Performed by: OPHTHALMOLOGY

## 2020-06-16 PROCEDURE — 67916 REPAIR EYELID DEFECT: CPT | Performed by: CLINIC/CENTER

## 2020-06-16 PROCEDURE — G8907 PT DOC NO EVENTS ON DISCHARG: HCPCS | Performed by: CLINIC/CENTER

## 2020-06-16 PROCEDURE — G8907 PT DOC NO EVENTS ON DISCHARG: HCPCS | Performed by: OPHTHALMOLOGY

## 2020-06-16 PROCEDURE — G8918 PT W/O PREOP ORDER IV AB PRO: HCPCS | Performed by: OPHTHALMOLOGY

## 2020-06-26 ENCOUNTER — RX ONLY (RX ONLY)
Age: 85
End: 2020-06-26

## 2020-06-26 ENCOUNTER — DOCTOR'S OFFICE (OUTPATIENT)
Dept: URBAN - NONMETROPOLITAN AREA CLINIC 1 | Facility: CLINIC | Age: 85
Setting detail: OPHTHALMOLOGY
End: 2020-06-26
Payer: COMMERCIAL

## 2020-06-26 DIAGNOSIS — H02.105: ICD-10-CM

## 2020-06-26 DIAGNOSIS — H04.122: ICD-10-CM

## 2020-06-26 DIAGNOSIS — H04.222: ICD-10-CM

## 2020-06-26 DIAGNOSIS — H04.121: ICD-10-CM

## 2020-06-26 PROCEDURE — 99024 POSTOP FOLLOW-UP VISIT: CPT | Performed by: OPHTHALMOLOGY

## 2020-06-26 ASSESSMENT — REFRACTION_CURRENTRX
OS_CYLINDER: -0.50
OS_VPRISM_DIRECTION: BF
OS_OVR_VA: 20/
OD_OVR_VA: 20/
OD_ADD: +3.00
OD_AXIS: 086
OS_ADD: +3.00
OD_SPHERE: +0.25
OS_AXIS: 080
OD_CYLINDER: -0.50
OD_VPRISM_DIRECTION: BF
OS_SPHERE: +0.25

## 2020-06-26 ASSESSMENT — REFRACTION_MANIFEST
OS_VA2: 20/30-2
OS_ADD: +2.75
OD_VA2: 20/30-2
OD_ADD: +2.75
OD_VA1: 20/30-2
OS_AXIS: 110
OS_CYLINDER: -0.50
OS_VA1: 20/30-2
OD_SPHERE: +0.50
OS_SPHERE: +0.50
OD_AXIS: 075
OD_CYLINDER: -0.25

## 2020-06-26 ASSESSMENT — CONFRONTATIONAL VISUAL FIELD TEST (CVF)
OS_FINDINGS: FULL
OD_FINDINGS: FULL

## 2020-06-26 ASSESSMENT — DRY EYES - PHYSICIAN NOTES
OS_GENERALCOMMENTS: KSICCA
OD_GENERALCOMMENTS: KSICCA

## 2020-06-26 ASSESSMENT — SUPERFICIAL PUNCTATE KERATITIS (SPK)
OS_SPK: 1+ 2+
OD_SPK: ABSENT

## 2020-06-26 ASSESSMENT — REFRACTION_AUTOREFRACTION
OS_SPHERE: +1.25
OD_CYLINDER: 0.00
OS_CYLINDER: -0.75
OS_AXIS: 095
OD_SPHERE: +0.25

## 2020-06-26 ASSESSMENT — VISUAL ACUITY
OD_BCVA: 20/50+2
OS_BCVA: 20/20-2

## 2020-06-26 ASSESSMENT — LID POSITION - ECTROPION: OS_ECTROPION: ABSENT

## 2020-06-26 ASSESSMENT — SPHEQUIV_DERIVED
OD_SPHEQUIV: 0.375
OD_SPHEQUIV: 0.25
OS_SPHEQUIV: 0.25
OS_SPHEQUIV: 0.875

## 2020-06-26 ASSESSMENT — LID POSITION - DERMATOCHALASIS
OD_DERMATOCHALASIS: RUL 2+
OS_DERMATOCHALASIS: LUL 2+

## 2020-08-26 ENCOUNTER — DOCTOR'S OFFICE (OUTPATIENT)
Dept: URBAN - NONMETROPOLITAN AREA CLINIC 1 | Facility: CLINIC | Age: 85
Setting detail: OPHTHALMOLOGY
End: 2020-08-26
Payer: COMMERCIAL

## 2020-08-26 VITALS — HEIGHT: 60 IN

## 2020-08-26 DIAGNOSIS — H04.121: ICD-10-CM

## 2020-08-26 DIAGNOSIS — H04.122: ICD-10-CM

## 2020-08-26 DIAGNOSIS — H04.222: ICD-10-CM

## 2020-08-26 DIAGNOSIS — H02.105: ICD-10-CM

## 2020-08-26 PROCEDURE — 99024 POSTOP FOLLOW-UP VISIT: CPT | Performed by: OPHTHALMOLOGY

## 2020-08-26 ASSESSMENT — REFRACTION_MANIFEST
OD_VA2: 20/30-2
OS_SPHERE: +0.50
OS_AXIS: 110
OS_ADD: +2.75
OD_CYLINDER: -0.25
OD_VA1: 20/30-2
OS_VA2: 20/30-2
OD_SPHERE: +0.50
OD_AXIS: 075
OS_CYLINDER: -0.50
OS_VA1: 20/30-2
OD_ADD: +2.75

## 2020-08-26 ASSESSMENT — REFRACTION_CURRENTRX
OS_AXIS: 080
OS_SPHERE: +0.25
OD_VPRISM_DIRECTION: BF
OD_AXIS: 086
OD_OVR_VA: 20/
OS_VPRISM_DIRECTION: BF
OS_ADD: +3.00
OD_CYLINDER: -0.50
OS_OVR_VA: 20/
OD_SPHERE: +0.25
OD_ADD: +3.00
OS_CYLINDER: -0.50

## 2020-08-26 ASSESSMENT — REFRACTION_AUTOREFRACTION
OD_SPHERE: +0.25
OS_AXIS: 095
OS_SPHERE: +1.25
OS_CYLINDER: -0.75
OD_CYLINDER: 0.00

## 2020-08-26 ASSESSMENT — LID POSITION - DERMATOCHALASIS
OD_DERMATOCHALASIS: RUL 2+
OS_DERMATOCHALASIS: LUL 2+

## 2020-08-26 ASSESSMENT — VISUAL ACUITY
OD_BCVA: 20/25-2
OS_BCVA: 20/25+2

## 2020-08-26 ASSESSMENT — SUPERFICIAL PUNCTATE KERATITIS (SPK)
OD_SPK: 1+
OS_SPK: 1+

## 2020-08-26 ASSESSMENT — SPHEQUIV_DERIVED
OD_SPHEQUIV: 0.375
OD_SPHEQUIV: 0.25
OS_SPHEQUIV: 0.25
OS_SPHEQUIV: 0.875

## 2020-08-26 ASSESSMENT — CONFRONTATIONAL VISUAL FIELD TEST (CVF)
OD_FINDINGS: FULL
OS_FINDINGS: FULL

## 2020-08-26 ASSESSMENT — LID POSITION - ECTROPION: OS_ECTROPION: ABSENT

## 2020-08-26 ASSESSMENT — DRY EYES - PHYSICIAN NOTES
OS_GENERALCOMMENTS: KSICCA
OD_GENERALCOMMENTS: KSICCA

## 2020-09-11 ENCOUNTER — DOCTOR'S OFFICE (OUTPATIENT)
Dept: URBAN - NONMETROPOLITAN AREA CLINIC 1 | Facility: CLINIC | Age: 85
Setting detail: OPHTHALMOLOGY
End: 2020-09-11
Payer: COMMERCIAL

## 2020-09-11 DIAGNOSIS — Z96.1: ICD-10-CM

## 2020-09-11 DIAGNOSIS — H02.831: ICD-10-CM

## 2020-09-11 DIAGNOSIS — H04.121: ICD-10-CM

## 2020-09-11 DIAGNOSIS — H35.3131: ICD-10-CM

## 2020-09-11 DIAGNOSIS — H04.122: ICD-10-CM

## 2020-09-11 DIAGNOSIS — H02.834: ICD-10-CM

## 2020-09-11 DIAGNOSIS — H02.105: ICD-10-CM

## 2020-09-11 DIAGNOSIS — H35.373: ICD-10-CM

## 2020-09-11 DIAGNOSIS — H40.1131: ICD-10-CM

## 2020-09-11 PROBLEM — H04.222: Status: RESOLVED | Noted: 2018-06-26 | Resolved: 2020-09-11

## 2020-09-11 PROCEDURE — 99024 POSTOP FOLLOW-UP VISIT: CPT | Performed by: OPHTHALMOLOGY

## 2020-09-11 PROCEDURE — 92083 EXTENDED VISUAL FIELD XM: CPT | Performed by: OPHTHALMOLOGY

## 2020-09-11 PROCEDURE — 76514 ECHO EXAM OF EYE THICKNESS: CPT | Performed by: OPHTHALMOLOGY

## 2020-09-11 PROCEDURE — 92134 CPTRZ OPH DX IMG PST SGM RTA: CPT | Performed by: OPHTHALMOLOGY

## 2020-09-11 ASSESSMENT — REFRACTION_CURRENTRX
OD_CYLINDER: -0.50
OD_AXIS: 086
OS_AXIS: 080
OS_VPRISM_DIRECTION: BF
OD_OVR_VA: 20/
OD_SPHERE: +0.25
OS_SPHERE: +0.25
OD_VPRISM_DIRECTION: BF
OD_ADD: +3.00
OS_ADD: +3.00
OS_CYLINDER: -0.50
OS_OVR_VA: 20/

## 2020-09-11 ASSESSMENT — REFRACTION_MANIFEST
OD_AXIS: 075
OD_ADD: +2.75
OS_AXIS: 110
OS_ADD: +2.75
OS_SPHERE: +0.50
OD_CYLINDER: -0.25
OS_VA1: 20/30-2
OD_VA2: 20/30-2
OS_VA2: 20/30-2
OS_CYLINDER: -0.50
OD_SPHERE: +0.50
OD_VA1: 20/30-2

## 2020-09-11 ASSESSMENT — REFRACTION_AUTOREFRACTION
OD_CYLINDER: -0.75
OS_SPHERE: +1.25
OD_SPHERE: +0.50
OS_CYLINDER: -1.00
OS_AXIS: 094
OD_AXIS: 121

## 2020-09-11 ASSESSMENT — VISUAL ACUITY
OS_BCVA: 20/25-1
OD_BCVA: 20/30-2

## 2020-09-11 ASSESSMENT — DRY EYES - PHYSICIAN NOTES
OD_GENERALCOMMENTS: KSICCA
OS_GENERALCOMMENTS: KSICCA

## 2020-09-11 ASSESSMENT — CONFRONTATIONAL VISUAL FIELD TEST (CVF)
OS_FINDINGS: FULL
OD_FINDINGS: FULL

## 2020-09-11 ASSESSMENT — LID POSITION - DERMATOCHALASIS
OD_DERMATOCHALASIS: RUL 2+
OS_DERMATOCHALASIS: LUL 2+

## 2020-09-11 ASSESSMENT — SPHEQUIV_DERIVED
OS_SPHEQUIV: 0.75
OS_SPHEQUIV: 0.25
OD_SPHEQUIV: 0.375
OD_SPHEQUIV: 0.125

## 2020-09-11 ASSESSMENT — PACHYMETRY
OS_CT_CORRECTION: 1
OS_CT_UM: 523
OD_CT_UM: 524
OD_CT_CORRECTION: 1

## 2020-09-11 ASSESSMENT — LID POSITION - ECTROPION: OS_ECTROPION: ABSENT

## 2020-09-11 ASSESSMENT — SUPERFICIAL PUNCTATE KERATITIS (SPK)
OS_SPK: 1+
OD_SPK: 1+

## 2020-12-14 ENCOUNTER — DOCTOR'S OFFICE (OUTPATIENT)
Dept: URBAN - NONMETROPOLITAN AREA CLINIC 1 | Facility: CLINIC | Age: 85
Setting detail: OPHTHALMOLOGY
End: 2020-12-14
Payer: COMMERCIAL

## 2020-12-14 VITALS — HEIGHT: 60 IN

## 2020-12-14 DIAGNOSIS — H40.1131: ICD-10-CM

## 2020-12-14 PROCEDURE — 92014 COMPRE OPH EXAM EST PT 1/>: CPT | Performed by: OPHTHALMOLOGY

## 2020-12-14 ASSESSMENT — PACHYMETRY
OD_CT_CORRECTION: 1
OS_CT_CORRECTION: 1
OS_CT_UM: 523
OD_CT_UM: 524

## 2020-12-14 ASSESSMENT — CONFRONTATIONAL VISUAL FIELD TEST (CVF)
OD_FINDINGS: FULL
OS_FINDINGS: FULL

## 2020-12-14 ASSESSMENT — LID POSITION - DERMATOCHALASIS
OD_DERMATOCHALASIS: RUL 2+
OS_DERMATOCHALASIS: LUL 2+

## 2020-12-14 ASSESSMENT — SUPERFICIAL PUNCTATE KERATITIS (SPK)
OS_SPK: T 1+
OD_SPK: T 1+

## 2020-12-14 ASSESSMENT — TONOMETRY
OS_IOP_MMHG: 15
OD_IOP_MMHG: 10

## 2020-12-14 ASSESSMENT — LID POSITION - ECTROPION: OS_ECTROPION: ABSENT

## 2021-04-01 ENCOUNTER — IMMUNIZATIONS (OUTPATIENT)
Dept: FAMILY MEDICINE CLINIC | Facility: HOSPITAL | Age: 86
End: 2021-04-01

## 2021-04-01 DIAGNOSIS — Z23 ENCOUNTER FOR IMMUNIZATION: Primary | ICD-10-CM

## 2021-04-01 PROCEDURE — 91300 SARS-COV-2 / COVID-19 MRNA VACCINE (PFIZER-BIONTECH) 30 MCG: CPT

## 2021-04-01 PROCEDURE — 0001A SARS-COV-2 / COVID-19 MRNA VACCINE (PFIZER-BIONTECH) 30 MCG: CPT

## 2021-04-22 ENCOUNTER — IMMUNIZATIONS (OUTPATIENT)
Dept: FAMILY MEDICINE CLINIC | Facility: HOSPITAL | Age: 86
End: 2021-04-22

## 2021-04-22 DIAGNOSIS — Z23 ENCOUNTER FOR IMMUNIZATION: Primary | ICD-10-CM

## 2021-04-22 PROCEDURE — 0002A SARS-COV-2 / COVID-19 MRNA VACCINE (PFIZER-BIONTECH) 30 MCG: CPT

## 2021-04-22 PROCEDURE — 91300 SARS-COV-2 / COVID-19 MRNA VACCINE (PFIZER-BIONTECH) 30 MCG: CPT

## 2021-05-04 ENCOUNTER — DOCTOR'S OFFICE (OUTPATIENT)
Dept: URBAN - NONMETROPOLITAN AREA CLINIC 1 | Facility: CLINIC | Age: 86
Setting detail: OPHTHALMOLOGY
End: 2021-05-04
Payer: COMMERCIAL

## 2021-05-04 DIAGNOSIS — H40.1131: ICD-10-CM

## 2021-05-04 PROCEDURE — 76514 ECHO EXAM OF EYE THICKNESS: CPT | Performed by: OPHTHALMOLOGY

## 2021-05-04 PROCEDURE — 92133 CPTRZD OPH DX IMG PST SGM ON: CPT | Performed by: OPHTHALMOLOGY

## 2021-05-04 PROCEDURE — 99214 OFFICE O/P EST MOD 30 MIN: CPT | Performed by: OPHTHALMOLOGY

## 2021-05-04 ASSESSMENT — REFRACTION_CURRENTRX
OD_AXIS: 086
OD_VPRISM_DIRECTION: BF
OS_VPRISM_DIRECTION: BF
OS_OVR_VA: 20/
OS_SPHERE: +0.25
OD_ADD: +3.00
OD_CYLINDER: -0.50
OS_AXIS: 080
OS_ADD: +3.00
OD_OVR_VA: 20/
OS_CYLINDER: -0.50
OD_SPHERE: +0.25

## 2021-05-04 ASSESSMENT — LID POSITION - DERMATOCHALASIS
OS_DERMATOCHALASIS: LUL 2+
OD_DERMATOCHALASIS: RUL 2+

## 2021-05-04 ASSESSMENT — REFRACTION_MANIFEST
OD_VA1: 20/30-2
OS_VA2: 20/30-2
OD_VA2: 20/30-2
OS_AXIS: 110
OS_ADD: +2.75
OS_VA1: 20/30-2
OD_ADD: +2.75
OD_SPHERE: +0.50
OS_CYLINDER: -0.50
OD_AXIS: 075
OD_CYLINDER: -0.25
OS_SPHERE: +0.50

## 2021-05-04 ASSESSMENT — SPHEQUIV_DERIVED
OD_SPHEQUIV: 0.125
OS_SPHEQUIV: 0.25
OS_SPHEQUIV: 0.75
OD_SPHEQUIV: 0.375

## 2021-05-04 ASSESSMENT — VISUAL ACUITY
OD_BCVA: 20/30+2
OS_BCVA: 20/20-1

## 2021-05-04 ASSESSMENT — PACHYMETRY
OD_CT_UM: 498
OS_CT_UM: 495
OD_CT_CORRECTION: 4
OS_CT_CORRECTION: 4

## 2021-05-04 ASSESSMENT — SUPERFICIAL PUNCTATE KERATITIS (SPK)
OD_SPK: T 1+
OS_SPK: T 1+

## 2021-05-04 ASSESSMENT — REFRACTION_AUTOREFRACTION
OS_SPHERE: +1.25
OS_AXIS: 094
OS_CYLINDER: -1.00
OD_CYLINDER: -0.75
OD_SPHERE: +0.50
OD_AXIS: 121

## 2021-05-04 ASSESSMENT — CONFRONTATIONAL VISUAL FIELD TEST (CVF)
OD_FINDINGS: FULL
OS_FINDINGS: FULL

## 2021-05-04 ASSESSMENT — LID POSITION - ECTROPION: OS_ECTROPION: ABSENT

## 2021-05-04 ASSESSMENT — TONOMETRY
OS_IOP_MMHG: 16
OD_IOP_MMHG: 12

## 2021-07-26 ENCOUNTER — DOCTOR'S OFFICE (OUTPATIENT)
Dept: URBAN - NONMETROPOLITAN AREA CLINIC 1 | Facility: CLINIC | Age: 86
Setting detail: OPHTHALMOLOGY
End: 2021-07-26
Payer: COMMERCIAL

## 2021-07-26 ENCOUNTER — RX ONLY (RX ONLY)
Age: 86
End: 2021-07-26

## 2021-07-26 DIAGNOSIS — H40.1131: ICD-10-CM

## 2021-07-26 PROCEDURE — 92133 CPTRZD OPH DX IMG PST SGM ON: CPT | Performed by: OPHTHALMOLOGY

## 2021-07-26 PROCEDURE — 92014 COMPRE OPH EXAM EST PT 1/>: CPT | Performed by: OPHTHALMOLOGY

## 2021-07-26 ASSESSMENT — LID POSITION - DERMATOCHALASIS
OD_DERMATOCHALASIS: RUL 2+
OS_DERMATOCHALASIS: LUL 2+

## 2021-07-26 ASSESSMENT — TONOMETRY
OS_IOP_MMHG: 14
OD_IOP_MMHG: 10

## 2021-07-26 ASSESSMENT — PACHYMETRY
OD_CT_UM: 498
OS_CT_UM: 495
OS_CT_CORRECTION: 4
OD_CT_CORRECTION: 4

## 2021-07-26 ASSESSMENT — SUPERFICIAL PUNCTATE KERATITIS (SPK)
OS_SPK: T 1+
OD_SPK: T 1+

## 2021-07-26 ASSESSMENT — LID POSITION - ECTROPION: OS_ECTROPION: ABSENT

## 2021-07-26 ASSESSMENT — CONFRONTATIONAL VISUAL FIELD TEST (CVF)
OD_FINDINGS: FULL
OS_FINDINGS: FULL

## 2021-07-29 ASSESSMENT — REFRACTION_AUTOREFRACTION
OD_CYLINDER: -0.50
OS_CYLINDER: -1.00
OS_CYLINDER: -1.00
OD_CYLINDER: -0.75
OD_SPHERE: +0.50
OS_SPHERE: +1.50
OD_SPHERE: +0.50
OS_AXIS: 093
OD_AXIS: 121
OS_AXIS: 094
OD_AXIS: 065
OS_SPHERE: +1.25

## 2021-07-29 ASSESSMENT — REFRACTION_MANIFEST
OS_CYLINDER: -0.50
OS_VA2: 20/30-2
OD_VA1: 20/30-2
OS_ADD: +2.75
OD_CYLINDER: -0.25
OD_AXIS: 075
OD_VA2: 20/30-2
OS_AXIS: 110
OS_VA2: 20/30-2
OD_AXIS: 075
OS_AXIS: 110
OD_SPHERE: +0.50
OS_CYLINDER: -0.50
OD_ADD: +2.75
OS_ADD: +2.75
OS_SPHERE: +0.50
OD_SPHERE: +0.50
OS_VA1: 20/30-2
OS_SPHERE: +0.50
OD_CYLINDER: -0.25
OD_VA2: 20/30-2
OD_VA1: 20/30-2
OS_VA1: 20/30-2
OD_ADD: +2.75

## 2021-07-29 ASSESSMENT — REFRACTION_CURRENTRX
OS_AXIS: 080
OS_ADD: +3.00
OD_ADD: +3.00
OD_OVR_VA: 20/
OS_VPRISM_DIRECTION: BF
OD_AXIS: 086
OD_OVR_VA: 20/
OD_VPRISM_DIRECTION: BF
OS_AXIS: 080
OD_SPHERE: +0.25
OS_SPHERE: +0.25
OS_OVR_VA: 20/
OS_OVR_VA: 20/
OS_SPHERE: +0.25
OS_ADD: +3.00
OD_CYLINDER: -0.50
OD_AXIS: 086
OD_SPHERE: +0.25
OS_CYLINDER: -0.50
OD_ADD: +3.00
OS_CYLINDER: -0.50
OS_VPRISM_DIRECTION: BF
OD_VPRISM_DIRECTION: BF
OD_CYLINDER: -0.50

## 2021-07-29 ASSESSMENT — VISUAL ACUITY
OS_BCVA: 20/20-2
OS_BCVA: 20/25
OD_BCVA: 20/30-2
OD_BCVA: 20/30-2

## 2021-07-29 ASSESSMENT — SPHEQUIV_DERIVED
OS_SPHEQUIV: 0.25
OS_SPHEQUIV: 0.25
OD_SPHEQUIV: 0.375
OD_SPHEQUIV: 0.375
OD_SPHEQUIV: 0.25
OS_SPHEQUIV: 1
OS_SPHEQUIV: 0.75
OD_SPHEQUIV: 0.125

## 2021-10-05 ENCOUNTER — DOCTOR'S OFFICE (OUTPATIENT)
Dept: URBAN - NONMETROPOLITAN AREA CLINIC 1 | Facility: CLINIC | Age: 86
Setting detail: OPHTHALMOLOGY
End: 2021-10-05
Payer: COMMERCIAL

## 2021-10-05 DIAGNOSIS — H04.122: ICD-10-CM

## 2021-10-05 DIAGNOSIS — H35.373: ICD-10-CM

## 2021-10-05 DIAGNOSIS — Z96.1: ICD-10-CM

## 2021-10-05 DIAGNOSIS — H04.121: ICD-10-CM

## 2021-10-05 DIAGNOSIS — H40.021: ICD-10-CM

## 2021-10-05 DIAGNOSIS — H35.3131: ICD-10-CM

## 2021-10-05 DIAGNOSIS — H40.1121: ICD-10-CM

## 2021-10-05 PROBLEM — H02.831 DERMATOCHALASIS; RIGHT UPPER LID, LEFT UPPER LID: Status: ACTIVE | Noted: 2020-05-22

## 2021-10-05 PROBLEM — H02.105: Status: ACTIVE | Noted: 2018-07-27

## 2021-10-05 PROBLEM — H02.834 DERMATOCHALASIS; RIGHT UPPER LID, LEFT UPPER LID: Status: ACTIVE | Noted: 2020-05-22

## 2021-10-05 PROBLEM — B02.29 POST HERPETIC NEURALGIA: Status: ACTIVE | Noted: 2018-06-26

## 2021-10-05 PROCEDURE — 92014 COMPRE OPH EXAM EST PT 1/>: CPT | Performed by: OPHTHALMOLOGY

## 2021-10-05 PROCEDURE — 92134 CPTRZ OPH DX IMG PST SGM RTA: CPT | Performed by: OPHTHALMOLOGY

## 2021-10-05 ASSESSMENT — REFRACTION_AUTOREFRACTION
OS_CYLINDER: -1.00
OS_SPHERE: +1.50
OD_AXIS: 065
OS_AXIS: 093
OD_CYLINDER: -0.50
OD_SPHERE: +0.50

## 2021-10-05 ASSESSMENT — REFRACTION_MANIFEST
OS_VA2: 20/30-2
OD_SPHERE: +0.50
OD_VA1: 20/30-2
OD_CYLINDER: -0.25
OS_SPHERE: +0.50
OD_VA2: 20/30-2
OS_VA1: 20/30-2
OD_ADD: +2.75
OD_AXIS: 075
OS_ADD: +2.75
OS_AXIS: 110
OS_CYLINDER: -0.50

## 2021-10-05 ASSESSMENT — REFRACTION_CURRENTRX
OD_CYLINDER: -0.50
OS_OVR_VA: 20/
OD_OVR_VA: 20/
OD_AXIS: 086
OD_ADD: +3.00
OS_CYLINDER: -0.50
OS_AXIS: 080
OS_VPRISM_DIRECTION: BF
OD_VPRISM_DIRECTION: BF
OS_SPHERE: +0.25
OS_ADD: +3.00
OD_SPHERE: +0.25

## 2021-10-05 ASSESSMENT — SUPERFICIAL PUNCTATE KERATITIS (SPK)
OS_SPK: T 1+
OD_SPK: T 1+

## 2021-10-05 ASSESSMENT — CONFRONTATIONAL VISUAL FIELD TEST (CVF)
OD_FINDINGS: FULL
OS_FINDINGS: FULL

## 2021-10-05 ASSESSMENT — VISUAL ACUITY
OS_BCVA: 20/20
OD_BCVA: 20/20-2

## 2021-10-05 ASSESSMENT — LID POSITION - ECTROPION: OS_ECTROPION: ABSENT

## 2021-10-05 ASSESSMENT — SPHEQUIV_DERIVED
OS_SPHEQUIV: 0.25
OD_SPHEQUIV: 0.25
OD_SPHEQUIV: 0.375
OS_SPHEQUIV: 1

## 2021-10-05 ASSESSMENT — LID POSITION - DERMATOCHALASIS
OD_DERMATOCHALASIS: RUL 2+
OS_DERMATOCHALASIS: LUL 2+

## 2021-10-05 ASSESSMENT — TONOMETRY
OD_IOP_MMHG: 13
OS_IOP_MMHG: 14

## 2021-10-05 ASSESSMENT — PACHYMETRY
OD_CT_UM: 498
OD_CT_CORRECTION: 4
OS_CT_UM: 495
OS_CT_CORRECTION: 4